# Patient Record
Sex: FEMALE | Race: WHITE | NOT HISPANIC OR LATINO | Employment: OTHER | URBAN - NONMETROPOLITAN AREA
[De-identification: names, ages, dates, MRNs, and addresses within clinical notes are randomized per-mention and may not be internally consistent; named-entity substitution may affect disease eponyms.]

---

## 2024-10-29 NOTE — PROGRESS NOTES
[facility] Cardiology  Consultation H&P  Julia Haque  1940  [address]  [place] [address]     VISIT DATE:  10/30/24    PCP: Ashely Donahue MD  [address] [place] [address] [address]  [facility] [id number]    IDENTIFICATION: A 84 y.o.  female from [facility], former owner of construction company  [facility] 2017    PROBLEM LIST:   Bradycardia, 1st deg AVB  Chest pain  11/14 CCS 0  12/17 Deanne MPS: wnl  Palpitations  HLD, statin/Zetia intolerant (tolerated for 10 years prior to d/c)   5/17    [id number]-  10/24  [id number]-  - rosuvastatin restarted  Hypothyroidism  Surgical history:  Tubal ligation  Partial thyroid lobectomy    CC:  Chief Complaint   Patient presents with    1st Degree AB       Allergies  Allergies   Allergen Reactions    Nuts     Sulfa Antibiotics        Current Medications  Current Outpatient Medications   Medication Instructions    levothyroxine (SYNTHROID, LEVOTHROID) 50 mcg, Daily    rosuvastatin (CRESTOR) 10 MG tablet 1 tablet, Daily    Vitamin D, Ergocalciferol, 78103 units capsule Weekly        History of Present Illness   HPI  Julia Haque is a 84 y.o. year old female with the above mentioned PMH who presents for consult from Ashely Donahue MD for evaluation of first-degree AV block seen on routine EKG.  Patient is asymptomatic and leads a robust life for 84-year-old    Pt denies any chest pain, dyspnea at rest, dyspnea on exertion, orthopnea, PND, palpitations, lower extremity edema, or claudication. Pt denies history of CHF, DVT, PE, MI, CVA, TIA, or rheumatic fever.       ROS  Review of Systems   Musculoskeletal:  Positive for joint pain.   All other systems reviewed and are negative.      SOCIAL HX  Social History     Socioeconomic History    Marital status:    Tobacco Use    Smoking status: Former    Smokeless tobacco: Never   Vaping Use    Vaping status: Never Used   Substance and Sexual Activity    Alcohol use: Yes  "    Comment: rare    Drug use: No    Sexual activity: Defer       FAMILY HX  Family History   Problem Relation Age of Onset    Heart failure Mother     Stroke Mother     COPD Father        Vitals:    10/30/24 1020   BP: 140/70   BP Location: Right arm   Patient Position: Sitting   Cuff Size: Adult   Pulse: (!) 46   SpO2: 97%   Weight: 82.6 kg (182 lb)   Height: 170.2 cm (67\")     Body mass index is 28.51 kg/m².     PHYSICAL EXAMINATION:  Constitutional:       Appearance: Healthy appearance. Not in distress.   Neck:      Vascular: No JVR. JVD normal.   Pulmonary:      Effort: Pulmonary effort is normal.      Breath sounds: Normal breath sounds. No wheezing. No rhonchi. No rales.   Chest:      Chest wall: Not tender to palpatation.   Cardiovascular:      PMI at left midclavicular line. Bradycardia present. Regular rhythm. Normal S1. Normal S2.       Murmurs: There is no murmur.      No gallop.  No click. No rub.   Pulses:     Intact distal pulses.   Edema:     Peripheral edema absent.   Abdominal:      General: Bowel sounds are normal.      Palpations: Abdomen is soft.      Tenderness: There is no abdominal tenderness.   Musculoskeletal: Normal range of motion.         General: No tenderness. Skin:     General: Skin is warm and dry.   Neurological:      General: No focal deficit present.      Mental Status: Alert and oriented to person, place and time.         Diagnostic Data:  Procedures    10/9/2024  TSH 7.22 (H)  VIT D 22.5 (L)  A1C  6.3  LIPID [id number]  CMP , BUN 11, CR 0.8, , K 4.5, CO2 29, ALB 4.5, ALT 22, AST 32  CBC WBC 8.2, HGB 14.4, HCT 43.4,     Advance Care Planning   ACP discussion was held with the patient during this visit. Patient has an advance directive (not in EMR), copy requested.         ASSESSMENT:     Diagnosis Plan   1. Bradycardia, sinus        2. Mixed hyperlipidemia            PLAN:    Bradycardia asymptomatic with historical first-degree AV block not significant " change from historical 2017.  Reassurance and discussion regarding symptomatic bradycardia discussed in the office today.    Dyslipidemia agree with restart of statin therapy          Ashely Donahue MD, thank you for referring Ms. Haque for evaluation.  I have forwarded my electronically generated recommendations to you for review.  Please do not hesitate to call with any questions.      Fco Arnold MD, FACC

## 2024-10-30 ENCOUNTER — OFFICE VISIT (OUTPATIENT)
Dept: CARDIOLOGY | Facility: CLINIC | Age: 84
End: 2024-10-30
Payer: MEDICARE

## 2024-10-30 ENCOUNTER — PATIENT ROUNDING (BHMG ONLY) (OUTPATIENT)
Dept: CARDIOLOGY | Facility: CLINIC | Age: 84
End: 2024-10-30
Payer: MEDICARE

## 2024-10-30 VITALS
SYSTOLIC BLOOD PRESSURE: 140 MMHG | WEIGHT: 182 LBS | OXYGEN SATURATION: 97 % | HEIGHT: 67 IN | HEART RATE: 46 BPM | DIASTOLIC BLOOD PRESSURE: 70 MMHG | BODY MASS INDEX: 28.56 KG/M2

## 2024-10-30 DIAGNOSIS — R00.1 BRADYCARDIA, SINUS: Primary | ICD-10-CM

## 2024-10-30 DIAGNOSIS — E78.2 MIXED HYPERLIPIDEMIA: ICD-10-CM

## 2024-10-30 PROCEDURE — 99203 OFFICE O/P NEW LOW 30 MIN: CPT | Performed by: INTERNAL MEDICINE

## 2024-10-30 RX ORDER — ERGOCALCIFEROL 1.25 MG/1
CAPSULE ORAL WEEKLY
COMMUNITY
Start: 2024-10-09

## 2024-10-30 RX ORDER — ROSUVASTATIN CALCIUM 10 MG/1
1 TABLET, COATED ORAL DAILY
COMMUNITY
Start: 2024-10-09 | End: 2025-01-07

## 2024-10-30 RX ORDER — LEVOTHYROXINE SODIUM 50 UG/1
50 TABLET ORAL DAILY
COMMUNITY
Start: 2024-10-09

## 2024-10-30 NOTE — PROGRESS NOTES
October 30, 2024    Hello, may I speak with Julia Haque?    My name is DENYS BIRMINGHAM      I am  with [facility] CARDIOLOGY  [address]  [facility] [address] [phone].    Before we get started may I verify your date of birth? 1940    I am calling to officially welcome you to our practice and ask about your recent visit. Is this a good time to talk? yes    Tell me about your visit with us. What things went well?  EVERYTHING WAS WONDERFUL TODAY.        We're always looking for ways to make our patients' experiences even better. Do you have recommendations on ways we may improve?  no    Overall were you satisfied with your first visit to our practice? Yes, ABSOLUTELY        I appreciate you taking the time to speak with me today. Is there anything else I can do for you? no      Thank you, and have a great day.

## 2025-08-05 ENCOUNTER — OFFICE VISIT (OUTPATIENT)
Dept: ORTHOPEDIC SURGERY | Facility: CLINIC | Age: 85
End: 2025-08-05
Payer: MEDICARE

## 2025-08-05 VITALS
HEIGHT: 67 IN | WEIGHT: 182 LBS | DIASTOLIC BLOOD PRESSURE: 80 MMHG | SYSTOLIC BLOOD PRESSURE: 150 MMHG | BODY MASS INDEX: 28.56 KG/M2

## 2025-08-05 DIAGNOSIS — M70.51 PES ANSERINUS BURSITIS OF BOTH KNEES: Primary | ICD-10-CM

## 2025-08-05 DIAGNOSIS — M70.52 PES ANSERINUS BURSITIS OF BOTH KNEES: Primary | ICD-10-CM

## 2025-08-05 DIAGNOSIS — M17.0 PRIMARY OSTEOARTHRITIS OF BOTH KNEES: ICD-10-CM

## 2025-08-05 RX ORDER — BUPIVACAINE HYDROCHLORIDE 2.5 MG/ML
2 INJECTION, SOLUTION EPIDURAL; INFILTRATION; INTRACAUDAL; PERINEURAL
Status: COMPLETED | OUTPATIENT
Start: 2025-08-05 | End: 2025-08-05

## 2025-08-05 RX ORDER — DEXAMETHASONE SODIUM PHOSPHATE 4 MG/ML
4 INJECTION, SOLUTION INTRA-ARTICULAR; INTRALESIONAL; INTRAMUSCULAR; INTRAVENOUS; SOFT TISSUE
Status: COMPLETED | OUTPATIENT
Start: 2025-08-05 | End: 2025-08-05

## 2025-08-05 RX ORDER — LIDOCAINE HYDROCHLORIDE 10 MG/ML
2 INJECTION, SOLUTION EPIDURAL; INFILTRATION; INTRACAUDAL; PERINEURAL
Status: COMPLETED | OUTPATIENT
Start: 2025-08-05 | End: 2025-08-05

## 2025-08-05 RX ADMIN — DEXAMETHASONE SODIUM PHOSPHATE 4 MG: 4 INJECTION, SOLUTION INTRA-ARTICULAR; INTRALESIONAL; INTRAMUSCULAR; INTRAVENOUS; SOFT TISSUE at 15:24

## 2025-08-05 RX ADMIN — BUPIVACAINE HYDROCHLORIDE 2 ML: 2.5 INJECTION, SOLUTION EPIDURAL; INFILTRATION; INTRACAUDAL; PERINEURAL at 15:24

## 2025-08-05 RX ADMIN — LIDOCAINE HYDROCHLORIDE 2 ML: 10 INJECTION, SOLUTION EPIDURAL; INFILTRATION; INTRACAUDAL; PERINEURAL at 15:24
